# Patient Record
(demographics unavailable — no encounter records)

---

## 2024-10-24 NOTE — PHYSICAL EXAM
[Tender] : was nontender [Alert] : alert [Oriented to Person] : oriented to person [Oriented to Place] : oriented to place [Oriented to Time] : oriented to time [Anxious] : anxious [de-identified] : NAD [de-identified] : NC/AT [de-identified] : non-tender

## 2024-10-24 NOTE — ASSESSMENT
[FreeTextEntry1] : 68yo F with cholecystostomy tube placed introperatively for chronic calculous cholecystitis. Now w/ no tubes, no RUQ pain on LF diet.   Will plan for cholecystectomy in early January, discussed that allowing for time to pass and inflammatory response to quell will allow for the highest likelihood of minimally invasive surgery.  Discussed that still this is not guaranteed.   Discussed the risks of surgery.

## 2024-10-24 NOTE — HISTORY OF PRESENT ILLNESS
[de-identified] : 68yo F h/o Stage 2 colon ca sp colectomy/adjuvant chemo recently admitted to [place] for ccy.  ++ adhesions and contracted GB, had lap cholecystostomy tube placed.  Referred for ongoing care.  Has crampy abd wall spasms.  Minimal output from radha-cholecystic JAZIEL (serosang) and cholecystostomy tube (hydropic fluid).  Tolerating diet.  (+) BMs.  Otherwise no fevers/chills. [de-identified] : 10/24/24 - was admitted after cholecystostomy tube study showed non-opacification of GB.  Had perihepatic ascites, drain placed.  Non-bilious.  Drain w minimal output, removed in clinic.  Tolerating LF diet.  No further RUQ pain.

## 2025-02-07 NOTE — HISTORY OF PRESENT ILLNESS
[de-identified] : 68yo F h/o Stage 2 colon ca sp colectomy/adjuvant chemo recently admitted to [place] for ccy.  ++ adhesions and contracted GB, had lap cholecystostomy tube placed.  Referred for ongoing care.  Has crampy abd wall spasms.  Minimal output from radha-cholecystic JAZIEL (serosang) and cholecystostomy tube (hydropic fluid).  Tolerating diet.  (+) BMs.  Otherwise no fevers/chills. [de-identified] : 10/24/24 - was admitted after cholecystostomy tube study showed non-opacification of GB.  Had perihepatic ascites, drain placed.  Non-bilious.  Drain w minimal output, removed in clinic.  Tolerating LF diet.  No further RUQ pain.  2/7/25 - sp open aniyah and cholangiography.  tolerating diet.  nl BMs.  occasional tylenol.  no incisional issues.

## 2025-02-07 NOTE — ASSESSMENT
[FreeTextEntry1] : 70yo F sp lap converted to open cholecystectomy and cholangiography.  recovering well.  reviewed path.  f/u prn

## 2025-02-07 NOTE — PHYSICAL EXAM
[Tender] : was nontender [No Rash or Lesion] : No rash or lesion [Alert] : alert [Oriented to Person] : oriented to person [Oriented to Place] : oriented to place [Oriented to Time] : oriented to time [Calm] : calm [de-identified] : NAD [de-identified] : NC/AT [de-identified] : lap, subcostal incision CDI